# Patient Record
Sex: FEMALE | Race: WHITE | NOT HISPANIC OR LATINO | Employment: PART TIME | URBAN - METROPOLITAN AREA
[De-identification: names, ages, dates, MRNs, and addresses within clinical notes are randomized per-mention and may not be internally consistent; named-entity substitution may affect disease eponyms.]

---

## 2023-11-20 ENCOUNTER — OFFICE VISIT (OUTPATIENT)
Dept: OBSTETRICS AND GYNECOLOGY | Facility: CLINIC | Age: 33
End: 2023-11-20
Payer: COMMERCIAL

## 2023-11-20 VITALS
BODY MASS INDEX: 27.16 KG/M2 | WEIGHT: 169 LBS | HEIGHT: 66 IN | SYSTOLIC BLOOD PRESSURE: 112 MMHG | DIASTOLIC BLOOD PRESSURE: 68 MMHG

## 2023-11-20 DIAGNOSIS — Z01.419 WELL WOMAN EXAM WITH ROUTINE GYNECOLOGICAL EXAM: Primary | ICD-10-CM

## 2023-11-20 DIAGNOSIS — N87.1 CIN II (CERVICAL INTRAEPITHELIAL NEOPLASIA II): ICD-10-CM

## 2023-11-20 DIAGNOSIS — N87.9 CERVICAL DYSPLASIA: ICD-10-CM

## 2023-11-20 PROCEDURE — 99385 PREV VISIT NEW AGE 18-39: CPT | Performed by: OBSTETRICS & GYNECOLOGY

## 2023-11-20 PROCEDURE — 87624 HPV HI-RISK TYP POOLED RSLT: CPT

## 2023-11-20 PROCEDURE — 1036F TOBACCO NON-USER: CPT | Performed by: OBSTETRICS & GYNECOLOGY

## 2023-11-20 PROCEDURE — 88175 CYTOPATH C/V AUTO FLUID REDO: CPT

## 2023-11-20 RX ORDER — LEVONORGESTREL 52 MG/1
1 INTRAUTERINE DEVICE INTRAUTERINE ONCE
COMMUNITY

## 2023-11-20 NOTE — PROGRESS NOTES
"  HPI  Anni Riley is a 33 y.o.  presents for a second opinion re: cervical dysplasia and annual GYN well woman exam.     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  Has been on birth control since 15 to help with acne.    had vasectomy    - last pap, pap hx: pap collected.   2023 SARA 1 per pt report. Not available in  system.  Pap 10/2022 [facility] CIN3, ECC negative: CIN3 is involving transformation zone and endocervical glands. Severe dysplasia from 5-9 o'clock. endocervical and ectocervical margins are negative for dysplasia.   Pap 2014 negative  - last mammogram, breast hx: n/a  - breast abnormalities: negative for lumps, skin changes, nipple discharge, pain  - last colonoscopy: n/a  - last DEXA scan: n/a    OB hx:   x2, 2013 and   GYN hx:   - menarche, menstrual pattern, LMP: Light bleeding monthly x 2 days.   - Sexual activity/issues, STI protection/history, birth control:  No concerns for STIs.  had vasectomy, IUD in place.   - HPV vaccine: Completed at 17  FH: No GYN related cancers including breast, ovarian, endometrial, or colon cancer.     ROS notable for none   10 point ROS negative except as listed above.     Physical exam  /68   Ht 1.676 m (5' 6\")   Wt 76.7 kg (169 lb)   LMP 2023 (Approximate)   BMI 27.28 kg/m²      Physical Exam  Constitutional:       Appearance: Normal appearance.   HENT:      Head: Normocephalic and atraumatic.   Eyes:      Extraocular Movements: Extraocular movements intact.      Conjunctiva/sclera: Conjunctivae normal.      Pupils: Pupils are equal, round, and reactive to light.   Pulmonary:      Effort: Pulmonary effort is normal.   Chest:   Breasts:     Right: Normal.      Left: Normal.   Abdominal:      General: Abdomen is flat.      Palpations: Abdomen is soft.   Genitourinary:     General: Normal vulva. "      Vagina: Normal.      Cervix: Normal.      Uterus: Normal.       Adnexa: Right adnexa normal and left adnexa normal.      Comments: no strings were visible on exam.  Skin:     General: Skin is warm and dry.   Neurological:      General: No focal deficit present.      Mental Status: She is alert.   Psychiatric:         Mood and Affect: Mood normal.         Behavior: Behavior normal.         Thought Content: Thought content normal.         Judgment: Judgment normal.       Assessment/Plan   - last pap, pap hx: pap collected.   4/14/2023 SARA 1 per pt report. Not available in  system.  Pap 10/2022 [facility] CIN3, ECC negative: CIN3 is involving transformation zone and endocervical glands. Severe dysplasia from 5-9 o'clock. endocervical and ectocervical margins are negative for dysplasia.   Pap 4/2014 negative  - last mammogram, breast hx: n/a  - breast abnormalities: negative for lumps, skin changes, nipple discharge, pain  - Mirena IUD in place and current. Pt informed of no IUD strings were on exam, likely cut in the setting of CKC. Could not verify IUD presence but  had vasectomy for contraception. Will RTC if she desires IUD removal.   - HPV vaccine: Completed    Scribe Attestation  By signing my name below, ISamantha Scribe   attest that this documentation has been prepared under the direction and in the presence of Sanju Rojo MD.

## 2023-12-08 LAB
CYTOLOGY CMNT CVX/VAG CYTO-IMP: NORMAL
HPV HR 12 DNA GENITAL QL NAA+PROBE: NEGATIVE
HPV HR GENOTYPES PNL CVX NAA+PROBE: NEGATIVE
HPV16 DNA SPEC QL NAA+PROBE: NEGATIVE
HPV18 DNA SPEC QL NAA+PROBE: NEGATIVE
LAB AP HPV GENOTYPE QUESTION: YES
LAB AP HPV HR: NORMAL
LAB AP PREVIOUS ABNORMAL HISTORY: NORMAL
LABORATORY COMMENT REPORT: NORMAL
LMP START DATE: NORMAL
PATH REPORT.TOTAL CANCER: NORMAL

## 2023-12-15 PROBLEM — N87.1 CIN II (CERVICAL INTRAEPITHELIAL NEOPLASIA II): Status: ACTIVE | Noted: 2023-12-15

## 2023-12-15 PROBLEM — Z01.419 WELL WOMAN EXAM WITH ROUTINE GYNECOLOGICAL EXAM: Status: ACTIVE | Noted: 2023-12-15

## 2023-12-15 PROBLEM — N87.9 CERVICAL DYSPLASIA: Status: ACTIVE | Noted: 2023-12-15

## 2023-12-16 NOTE — RESULT ENCOUNTER NOTE
Pap negative/negative.   Please ensure she is aware of results.      pap hx:   4/14/2023 SARA 1 per pt report. Not available in [facility] system.  Pap 10/2022 [facility] CIN3, ECC negative: CIN3 is involving transformation zone and endocervical glands. Severe dysplasia from 5-9 o'clock. endocervical and ectocervical margins are negative for dysplasia.   Pap 4/2014 negative    Your pap and HPV are both negative. I am unable to locate the pap from 4/2023 for review. I did review your [facility] specimen from 10/2022 and your margins were negative. Based on my pap smear alone, you would be due for repeat pap in one  year from now. Because we have an intervening pap from 4/2023 that was abnormal, you and I need to discuss how you want to move forward. You may want to consider a colposcopy. Can you get me a copy of your pap from 4/2023? Bring it with you and come to the office so we can discuss further. If you want to proceed directly to the colposcopy, please call and schedule it directly.

## 2024-02-26 ENCOUNTER — DOCUMENTATION (OUTPATIENT)
Dept: OBSTETRICS AND GYNECOLOGY | Facility: CLINIC | Age: 34
End: 2024-02-26
Payer: COMMERCIAL

## 2024-02-26 NOTE — PROGRESS NOTES
Pap records received:  Pap 4/14/2023 ASCUS/negative.  Given previous CIN3 requiring treatment and current pap, plan for repeat cotesting in one year from last pap

## 2024-11-21 NOTE — PROGRESS NOTES
"  HPI  Anni Riley is a 34 y.o.  presents for an annual GYN well woman exam.     No acute concerns.  Mom dx with vulvar cancer, SCC, dx this year.     Had son in 2018, then had her Mirena inserted.  Has been on birth control since 15 to help with acne.    had vasectomy    - last pap, pap hx: 2023 neg/neg. Collected.    2023 ASCUS/negative from previous record release, documented 2024 note.   Pap 10/2022 [facility] CIN3, ECC negative: CIN3 is involving transformation zone and endocervical glands. Severe dysplasia from 5-9 o'clock. endocervical and ectocervical margins are negative for dysplasia.   Pap 2014 negative  - last mammogram, breast hx: n/a  - breast abnormalities: negative for lumps, skin changes, nipple discharge, pain  - last colonoscopy: n/a  - last DEXA scan: n/a    OB hx:   x2, 2013 and   GYN hx:   - menarche, menstrual pattern, LMP: Light bleeding monthly x 2 days.   - Sexual activity/issues, STI protection/history, birth control:  No concerns for STIs.  had vasectomy, IUD in place.   - HPV vaccine: Completed at 17  FH: ,mom- vulvar SCC dx . No other GYN related cancers including breast, ovarian, endometrial, or colon cancer.     ROS notable for none   10 point ROS negative except as listed above.     Physical exam  /80   Ht 1.676 m (5' 6\")   Wt 74.4 kg (164 lb)   LMP 2024   BMI 26.47 kg/m²      Physical Exam  Constitutional:       Appearance: Normal appearance.   HENT:      Head: Normocephalic and atraumatic.   Eyes:      Extraocular Movements: Extraocular movements intact.      Conjunctiva/sclera: Conjunctivae normal.      Pupils: Pupils are equal, round, and reactive to light.   Pulmonary:      Effort: Pulmonary effort is normal.   Chest:   Breasts:     Right: Normal.      Left: Normal.   Abdominal:      General: Abdomen is flat.      Palpations: Abdomen is soft.   Genitourinary:     General: Normal vulva.      Vagina: Normal. "      Cervix: Normal.      Uterus: Normal.       Adnexa: Right adnexa normal and left adnexa normal.      Comments: no strings were visible on exam.  Skin:     General: Skin is warm and dry.   Neurological:      General: No focal deficit present.      Mental Status: She is alert.   Psychiatric:         Mood and Affect: Mood normal.         Behavior: Behavior normal.         Thought Content: Thought content normal.         Judgment: Judgment normal.         Assessment/Plan   Annual well woman exam  - last pap, pap hx: 11/20/2023 neg/neg. Collected.    4/14/2023 ASCUS/negative from previous record release, documented 2/2024 note.   Pap 10/2022 [facility] CIN3, ECC negative: CIN3 is involving transformation zone and endocervical glands. Severe dysplasia from 5-9 o'clock. endocervical and ectocervical margins are negative for dysplasia.   Pap 4/2014 negative  - last mammogram, breast hx: n/a  - breast exam negative today. Discussed breast self awareness.  - last colonoscopy: n/a  - last DEXA scan: n/a    Scribe Attestation  By signing my name below, ICarley, Scribe   attest that this documentation has been prepared under the direction and in the presence of Sanju Rojo MD.

## 2024-11-22 ENCOUNTER — APPOINTMENT (OUTPATIENT)
Dept: OBSTETRICS AND GYNECOLOGY | Facility: CLINIC | Age: 34
End: 2024-11-22
Payer: COMMERCIAL

## 2024-11-22 VITALS
WEIGHT: 164 LBS | DIASTOLIC BLOOD PRESSURE: 80 MMHG | SYSTOLIC BLOOD PRESSURE: 120 MMHG | HEIGHT: 66 IN | BODY MASS INDEX: 26.36 KG/M2

## 2024-11-22 DIAGNOSIS — Z01.419 WELL WOMAN EXAM WITH ROUTINE GYNECOLOGICAL EXAM: Primary | ICD-10-CM

## 2024-11-22 DIAGNOSIS — Z12.4 ROUTINE CERVICAL SMEAR: ICD-10-CM

## 2024-11-22 PROCEDURE — 99395 PREV VISIT EST AGE 18-39: CPT | Performed by: OBSTETRICS & GYNECOLOGY

## 2024-11-22 PROCEDURE — 3008F BODY MASS INDEX DOCD: CPT | Performed by: OBSTETRICS & GYNECOLOGY

## 2024-11-22 PROCEDURE — 87624 HPV HI-RISK TYP POOLED RSLT: CPT
